# Patient Record
Sex: MALE | URBAN - METROPOLITAN AREA
[De-identification: names, ages, dates, MRNs, and addresses within clinical notes are randomized per-mention and may not be internally consistent; named-entity substitution may affect disease eponyms.]

---

## 2020-10-15 ENCOUNTER — OFFICE VISIT (OUTPATIENT)
Dept: URBAN - METROPOLITAN AREA CLINIC 88 | Facility: CLINIC | Age: 55
End: 2020-10-15
Payer: COMMERCIAL

## 2020-10-15 DIAGNOSIS — H43.813 VITREOUS DEGENERATION, BILATERAL: ICD-10-CM

## 2020-10-15 DIAGNOSIS — H25.13 AGE-RELATED NUCLEAR CATARACT, BILATERAL: ICD-10-CM

## 2020-10-15 DIAGNOSIS — H16.401 CORNEAL NEOVASCULARIZATION OF RIGHT EYE: ICD-10-CM

## 2020-10-15 DIAGNOSIS — H35.411 LATTICE DEGENERATION OF RETINA, RIGHT EYE: ICD-10-CM

## 2020-10-15 PROCEDURE — 99204 OFFICE O/P NEW MOD 45 MIN: CPT | Performed by: OPTOMETRIST

## 2020-10-15 ASSESSMENT — INTRAOCULAR PRESSURE
OD: 16
OS: 16

## 2020-10-15 NOTE — IMPRESSION/PLAN
Impression: Keratoconus, unspecified, bilateral: H18.603. Plan: Discussed status in detail. No striae or hydrops observed. History of Intacts procedure. Pt will request medical records. RTC in 4-6 months for follow up and omar in [facility].

## 2020-10-15 NOTE — IMPRESSION/PLAN
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 Plan: History of longtime contact lens wear. Monitor.

## 2021-03-16 ENCOUNTER — OFFICE VISIT (OUTPATIENT)
Dept: URBAN - NONMETROPOLITAN AREA CLINIC 18 | Facility: CLINIC | Age: 56
End: 2021-03-16
Payer: COMMERCIAL

## 2021-03-16 DIAGNOSIS — H16.403 BILATERAL CORNEAL NEOVASCULARIZATIONS: Chronic | ICD-10-CM

## 2021-03-16 DIAGNOSIS — H18.603 KERATOCONUS, UNSPECIFIED, BILATERAL: Primary | ICD-10-CM

## 2021-03-16 PROCEDURE — 99213 OFFICE O/P EST LOW 20 MIN: CPT | Performed by: OPTOMETRIST

## 2021-03-16 PROCEDURE — 92025 CPTRIZED CORNEAL TOPOGRAPHY: CPT | Performed by: OPTOMETRIST

## 2021-03-16 ASSESSMENT — INTRAOCULAR PRESSURE
OS: 19
OD: 19

## 2021-03-16 NOTE — IMPRESSION/PLAN
Impression: Keratoconus, unspecified, bilateral: H18.603. Plan: Discussed status in detail. No striae or hydrops observed. Baseline omar today, PMD OU. History of Intacts procedure. Pt will request records for scleral lens fitting.  RTC in 6 months for complete exam.

## 2021-03-16 NOTE — IMPRESSION/PLAN
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 Plan: History of longtime contact lens wear. Monitor.